# Patient Record
Sex: FEMALE | Race: WHITE | Employment: OTHER | URBAN - METROPOLITAN AREA
[De-identification: names, ages, dates, MRNs, and addresses within clinical notes are randomized per-mention and may not be internally consistent; named-entity substitution may affect disease eponyms.]

---

## 2018-03-19 ENCOUNTER — OFFICE VISIT (OUTPATIENT)
Dept: OBGYN CLINIC | Age: 38
End: 2018-03-19

## 2018-03-19 VITALS — HEIGHT: 63 IN | BODY MASS INDEX: 20.55 KG/M2 | WEIGHT: 116 LBS

## 2018-03-19 DIAGNOSIS — B96.89 BACTERIAL VAGINAL INFECTION: ICD-10-CM

## 2018-03-19 DIAGNOSIS — N80.9 ENDOMETRIOSIS: ICD-10-CM

## 2018-03-19 DIAGNOSIS — Z01.419 WELL WOMAN EXAM WITH ROUTINE GYNECOLOGICAL EXAM: Primary | ICD-10-CM

## 2018-03-19 DIAGNOSIS — N76.0 BACTERIAL VAGINAL INFECTION: ICD-10-CM

## 2018-03-19 LAB
HCG URINE, QL. (POC): NEGATIVE
VALID INTERNAL CONTROL?: YES

## 2018-03-19 RX ORDER — METRONIDAZOLE 7.5 MG/G
1 GEL VAGINAL
Qty: 8 TUBE | Refills: 4 | Status: SHIPPED | OUTPATIENT
Start: 2018-03-22

## 2018-03-19 RX ORDER — NORETHINDRONE ACETATE AND ETHINYL ESTRADIOL .03; 1.5 MG/1; MG/1
1 TABLET ORAL DAILY
Qty: 3 PACKAGE | Refills: 5 | Status: SHIPPED | OUTPATIENT
Start: 2018-03-19 | End: 2019-01-14

## 2018-03-19 RX ORDER — METRONIDAZOLE 500 MG/1
500 TABLET ORAL 2 TIMES DAILY
Qty: 28 TAB | Refills: 0 | Status: SHIPPED | OUTPATIENT
Start: 2018-03-19 | End: 2018-04-02

## 2018-03-19 RX ORDER — NORETHINDRONE ACETATE AND ETHINYL ESTRADIOL .03; 1.5 MG/1; MG/1
TABLET ORAL
COMMUNITY
End: 2018-03-19 | Stop reason: SDUPTHER

## 2018-03-19 RX ORDER — UREA 10 %
2 LOTION (ML) TOPICAL 2 TIMES DAILY
Qty: 120 TAB | Refills: 0 | Status: SHIPPED | OUTPATIENT
Start: 2018-03-19 | End: 2018-04-18

## 2018-03-19 NOTE — PROGRESS NOTES
Subjective:   42 y.o. female for annual routine Pap and checkup. No LMP recorded. Patient is not currently having periods (Reason: Chemically Induced). Last pap smear:  1 year ago NILM  Menstrual history: no menses due to continuous COCs  Dysmenorrhea h/o endo, thus on continuous COCs  H/o STIs:  H/o genital HSV, remote h/o outbreak  Social History: single partner, contraception - OCP (Oral Contraceptive Pills). [unfilled]  OB History    Para Term  AB Living   2 2 2   2   SAB TAB Ectopic Molar Multiple Live Births              # Outcome Date GA Lbr Diomedes/2nd Weight Sex Delivery Anes PTL Lv   2 Term            1 Term                   Pertinent past medical hstory: no history of HTN, DVT, CAD, DM, liver disease, migraines or smoking. There is no problem list on file for this patient. There are no active problems to display for this patient. Current Outpatient Prescriptions   Medication Sig Dispense Refill    norethindrone ac-eth estradiol (GILDESS 1.5, ,) 1.5-30 mg-mcg tab Take 1 Tab by mouth daily. 3 Package 5    metroNIDAZOLE (FLAGYL) 500 mg tablet Take 1 Tab by mouth two (2) times a day for 14 days. 28 Tab 0    [START ON 3/22/2018] metroNIDAZOLE (METROGEL) 0.75 % vaginal gel Insert 1 Applicator into vagina two (2) days a week. 8 Tube 4    Lactobacillus Acidoph & Bulgar (FLORANEX) 1 million cell tab tablet Take 2 Tabs by mouth two (2) times a day for 30 days. 120 Tab 0     No Known Allergies  Past Medical History:   Diagnosis Date    Endometriosis      History reviewed. No pertinent surgical history. History reviewed. No pertinent family history. Social History   Substance Use Topics    Smoking status: Never Smoker    Smokeless tobacco: Never Used    Alcohol use No        ROS:  Feeling well. No dyspnea or chest pain on exertion. No abdominal pain, change in bowel habits, black or bloody stools. No urinary tract symptoms.  GYN ROS: no menses due to continuous COCs, no pelvic pain;  +discharge, recurrent, every after IC patient gets BV. Has been using Metrogel after IC. Has had this problem for >5 years  no breast pain or new or enlarging lumps on self exam. No neurological complaints. Objective:     Visit Vitals    Ht 5' 3\" (1.6 m)    Wt 116 lb (52.6 kg)    BMI 20.55 kg/m2     The patient appears well, alert, oriented x 3, in no distress. ENT normal.  Neck supple. No adenopathy or thyromegaly. ANDRE. Lungs are clear, good air entry, no wheezes, rhonchi or rales. S1 and S2 normal, no murmurs, regular rate and rhythm. Abdomen soft without tenderness, guarding, mass or organomegaly. Extremities show no edema, normal peripheral pulses. Neurological is normal, no focal findings. BREAST EXAM: right breast normal without mass, skin or nipple changes or axillary nodes, left breast normal without mass, skin or nipple changes or axillary nodes    PELVIC EXAM: VULVA: normal appearing vulva with no masses, tenderness or lesions, VAGINA: normal appearing vagina with normal color and discharge, no lesions, CERVIX: normal appearing cervix without discharge or lesions, UTERUS: uterus is normal size, shape, consistency and nontender, ADNEXA: normal adnexa in size, nontender and no masses    Assessment/Plan:   well woman  mammogram  pap smear  counseled on breast self exam and mammography screening  additional lab tests per orders  RTO in 6 months to follow up on recurrent BV    ICD-10-CM ICD-9-CM    1. Well woman exam with routine gynecological exam Z01.419 V72.31 GILDA MAMMO BI SCREENING INCL CAD      AMB POC URINE PREGNANCY TEST, VISUAL COLOR COMPARISON   2. Endometriosis N80.9 617.9 norethindrone ac-eth estradiol (GILDESS 1.5/30, 21,) 1.5-30 mg-mcg tab   3.  Bacterial vaginal infection N76.0 616.10 metroNIDAZOLE (FLAGYL) 500 mg tablet    B96.89 041.9 metroNIDAZOLE (METROGEL) 0.75 % vaginal gel      Lactobacillus Acidoph & Bulgar (FLORANEX) 1 million cell tab tablet     Long discussion regarding mgmt of recurrent BV. Will try 14 day course of Flagyl, followed by Metrogel twice weekly. Lactobacillus pills to be added. Patient's  was present in the room during this discussion    Sign ROR for pap results. reviewed diet, exercise and weight control. Discussed with patient that our office will call for abnormal lab results. Normal results can be reviewed through [facility]. If patient has not received a phone call from our office or there are no results found on Mychart, the patient has been instructed to call our office to follow up on results. I have verbalized the plan of care with patient and the patient expressed understanding.    All questions were answered

## 2023-01-31 RX ORDER — LEVOTHYROXINE SODIUM 0.03 MG/1
TABLET ORAL
COMMUNITY
Start: 2018-12-22

## 2023-01-31 RX ORDER — ESCITALOPRAM OXALATE 10 MG/1
TABLET ORAL
COMMUNITY
Start: 2018-12-22

## 2023-01-31 RX ORDER — ALPRAZOLAM 0.5 MG/1
TABLET ORAL
COMMUNITY
Start: 2019-01-12

## 2023-01-31 RX ORDER — METRONIDAZOLE 7.5 MG/G
37.5 GEL VAGINAL
COMMUNITY
Start: 2018-03-22